# Patient Record
Sex: FEMALE | Race: WHITE | URBAN - METROPOLITAN AREA
[De-identification: names, ages, dates, MRNs, and addresses within clinical notes are randomized per-mention and may not be internally consistent; named-entity substitution may affect disease eponyms.]

---

## 2020-12-26 LAB
BASOPHILS ABSOLUTE: ABNORMAL
BASOPHILS RELATIVE PERCENT: ABNORMAL
BUN BLDV-MCNC: 22 MG/DL
CALCIUM SERPL-MCNC: 8.2 MG/DL
CHLORIDE BLD-SCNC: 94 MMOL/L
CO2: 33 MMOL/L
CREAT SERPL-MCNC: 0.92 MG/DL
EOSINOPHILS ABSOLUTE: ABNORMAL
EOSINOPHILS RELATIVE PERCENT: ABNORMAL
GFR CALCULATED: NORMAL
GLUCOSE BLD-MCNC: 98 MG/DL
HCT VFR BLD CALC: 33.8 % (ref 36–46)
HEMOGLOBIN: 11 G/DL (ref 12–16)
LYMPHOCYTES ABSOLUTE: ABNORMAL
LYMPHOCYTES RELATIVE PERCENT: ABNORMAL
MCH RBC QN AUTO: 33.7 PG
MCHC RBC AUTO-ENTMCNC: 32.5 G/DL
MCV RBC AUTO: 103.8 FL
MONOCYTES ABSOLUTE: ABNORMAL
MONOCYTES RELATIVE PERCENT: ABNORMAL
NEUTROPHILS ABSOLUTE: ABNORMAL
NEUTROPHILS RELATIVE PERCENT: ABNORMAL
PLATELET # BLD: 81 K/ΜL
PMV BLD AUTO: ABNORMAL FL
POTASSIUM SERPL-SCNC: 3.1 MMOL/L
RBC # BLD: 3.26 10^6/ΜL
SODIUM BLD-SCNC: 139 MMOL/L
TROPONIN: 0.11
WBC # BLD: 4.9 10^3/ML

## 2020-12-28 ENCOUNTER — OFFICE VISIT (OUTPATIENT)
Dept: GERIATRIC MEDICINE | Age: 85
End: 2020-12-28
Payer: MEDICARE

## 2020-12-28 VITALS
BODY MASS INDEX: 21.56 KG/M2 | RESPIRATION RATE: 16 BRPM | TEMPERATURE: 98.7 F | HEART RATE: 68 BPM | DIASTOLIC BLOOD PRESSURE: 70 MMHG | OXYGEN SATURATION: 97 % | HEIGHT: 64 IN | WEIGHT: 126.3 LBS | SYSTOLIC BLOOD PRESSURE: 130 MMHG

## 2020-12-28 PROBLEM — M06.9 RHEUMATOID ARTHRITIS (HCC): Status: ACTIVE | Noted: 2020-12-28

## 2020-12-28 PROBLEM — K21.9 GASTROESOPHAGEAL REFLUX DISEASE WITHOUT ESOPHAGITIS: Status: ACTIVE | Noted: 2020-12-28

## 2020-12-28 PROBLEM — I87.2 PERIPHERAL VENOUS INSUFFICIENCY: Status: ACTIVE | Noted: 2020-12-28

## 2020-12-28 PROBLEM — Z98.890 H/O SPINAL SURGERY: Status: ACTIVE | Noted: 2020-12-28

## 2020-12-28 PROBLEM — I95.1 ORTHOSTATIC HYPOTENSION: Status: ACTIVE | Noted: 2020-12-28

## 2020-12-28 PROBLEM — H90.3 SENSORINEURAL HEARING LOSS, BILATERAL: Status: ACTIVE | Noted: 2019-01-07

## 2020-12-28 PROBLEM — Z98.890 HISTORY OF REPAIR OF ROTATOR CUFF: Status: ACTIVE | Noted: 2020-12-28

## 2020-12-28 PROBLEM — Z87.19 HISTORY OF ABDOMINAL HERNIA: Status: ACTIVE | Noted: 2020-12-28

## 2020-12-28 PROBLEM — Z96.659 HISTORY OF TOTAL KNEE ARTHROPLASTY: Status: ACTIVE | Noted: 2020-12-28

## 2020-12-28 PROBLEM — N39.0 RECURRENT URINARY TRACT INFECTION: Status: ACTIVE | Noted: 2020-12-28

## 2020-12-28 PROBLEM — F41.8 MIXED ANXIETY AND DEPRESSIVE DISORDER: Status: ACTIVE | Noted: 2020-12-28

## 2020-12-28 PROBLEM — R26.2 DIFFICULTY WALKING: Status: ACTIVE | Noted: 2020-12-28

## 2020-12-28 PROBLEM — M81.0 OSTEOPOROSIS: Status: ACTIVE | Noted: 2020-12-28

## 2020-12-28 PROBLEM — Z86.39 H/O: HYPOTHYROIDISM: Status: ACTIVE | Noted: 2020-12-28

## 2020-12-28 PROBLEM — I48.0 PAROXYSMAL ATRIAL FIBRILLATION (HCC): Status: ACTIVE | Noted: 2020-12-28

## 2020-12-28 PROBLEM — M43.10 ACQUIRED SPONDYLOLISTHESIS: Status: ACTIVE | Noted: 2020-12-28

## 2020-12-28 PROBLEM — I82.403 DEEP VEIN THROMBOSIS (DVT) OF BOTH LOWER EXTREMITIES (HCC): Status: ACTIVE | Noted: 2020-12-28

## 2020-12-28 PROBLEM — I10 ESSENTIAL HYPERTENSION: Status: ACTIVE | Noted: 2020-12-28

## 2020-12-28 PROBLEM — M19.90 OSTEOARTHROSIS: Status: ACTIVE | Noted: 2020-12-28

## 2020-12-28 PROBLEM — I50.22 CHRONIC SYSTOLIC HEART FAILURE (HCC): Status: ACTIVE | Noted: 2020-12-28

## 2020-12-28 PROBLEM — I25.10 ARTERIOSCLEROSIS OF CORONARY ARTERY: Status: ACTIVE | Noted: 2020-12-28

## 2020-12-28 PROBLEM — Z95.810 IMPLANTABLE CARDIOVERTER-DEFIBRILLATOR (ICD) IN SITU: Status: ACTIVE | Noted: 2020-12-28

## 2020-12-28 LAB — TROPONIN: 0.12 NG/ML (ref 0–0.01)

## 2020-12-28 PROCEDURE — 1123F ACP DISCUSS/DSCN MKR DOCD: CPT | Performed by: INTERNAL MEDICINE

## 2020-12-28 PROCEDURE — 99304 1ST NF CARE SF/LOW MDM 25: CPT | Performed by: INTERNAL MEDICINE

## 2020-12-28 PROCEDURE — G8484 FLU IMMUNIZE NO ADMIN: HCPCS | Performed by: INTERNAL MEDICINE

## 2020-12-28 RX ORDER — ATORVASTATIN CALCIUM 40 MG/1
40 TABLET, FILM COATED ORAL DAILY
COMMUNITY

## 2020-12-28 RX ORDER — TORSEMIDE 20 MG/1
20 TABLET ORAL EVERY MORNING
COMMUNITY

## 2020-12-28 RX ORDER — OXYCODONE HYDROCHLORIDE AND ACETAMINOPHEN 5; 325 MG/1; MG/1
1 TABLET ORAL EVERY 6 HOURS PRN
Qty: 45 TABLET | Refills: 0 | Status: SHIPPED | OUTPATIENT
Start: 2020-12-28 | End: 2021-01-11

## 2020-12-28 RX ORDER — PREDNISONE 2.5 MG
2.5 TABLET ORAL DAILY
COMMUNITY

## 2020-12-28 RX ORDER — TORSEMIDE 10 MG/1
10 TABLET ORAL NIGHTLY
COMMUNITY

## 2020-12-28 RX ORDER — ZINC GLUCONATE 50 MG
50 TABLET ORAL DAILY
COMMUNITY

## 2020-12-28 RX ORDER — CLOTRIMAZOLE 1 %
CREAM (GRAM) TOPICAL
COMMUNITY

## 2020-12-28 RX ORDER — DIGOXIN 125 MCG
125 TABLET ORAL
COMMUNITY

## 2020-12-28 RX ORDER — FOLIC ACID 1 MG/1
1 TABLET ORAL DAILY
COMMUNITY

## 2020-12-28 RX ORDER — DULOXETIN HYDROCHLORIDE 60 MG/1
60 CAPSULE, DELAYED RELEASE ORAL DAILY
COMMUNITY

## 2020-12-28 RX ORDER — MAGNESIUM OXIDE 400 MG/1
400 TABLET ORAL NIGHTLY
COMMUNITY

## 2020-12-28 RX ORDER — HYDROXYCHLOROQUINE SULFATE 200 MG/1
200 TABLET, FILM COATED ORAL DAILY
COMMUNITY

## 2020-12-28 RX ORDER — GABAPENTIN 100 MG/1
100 CAPSULE ORAL 3 TIMES DAILY
COMMUNITY

## 2020-12-28 RX ORDER — ALENDRONATE SODIUM 70 MG/1
70 TABLET ORAL
COMMUNITY

## 2020-12-28 RX ORDER — ACETAMINOPHEN 325 MG/1
650 TABLET ORAL EVERY 4 HOURS PRN
COMMUNITY

## 2020-12-28 RX ORDER — GUARN/MA-HUANG/P.GIN/S.GINSENG
2 TABLET ORAL NIGHTLY
COMMUNITY

## 2020-12-28 RX ORDER — POTASSIUM CHLORIDE 750 MG/1
10 TABLET, FILM COATED, EXTENDED RELEASE ORAL DAILY
COMMUNITY

## 2020-12-28 RX ORDER — OXYCODONE HYDROCHLORIDE AND ACETAMINOPHEN 5; 325 MG/1; MG/1
1 TABLET ORAL EVERY 6 HOURS PRN
COMMUNITY
End: 2020-12-28 | Stop reason: SDUPTHER

## 2020-12-28 RX ORDER — PANTOPRAZOLE SODIUM 40 MG/1
40 TABLET, DELAYED RELEASE ORAL DAILY
COMMUNITY

## 2020-12-28 RX ORDER — METOPROLOL TARTRATE 50 MG/1
50 TABLET, FILM COATED ORAL 2 TIMES DAILY
COMMUNITY

## 2020-12-28 RX ORDER — UBIDECARENONE 75 MG
50 CAPSULE ORAL DAILY
COMMUNITY

## 2020-12-28 RX ORDER — LANOLIN ALCOHOL/MO/W.PET/CERES
3 CREAM (GRAM) TOPICAL NIGHTLY
COMMUNITY

## 2020-12-28 RX ORDER — ELECTROLYTES/DEXTROSE
1 SOLUTION, ORAL ORAL DAILY
COMMUNITY

## 2020-12-28 ASSESSMENT — ENCOUNTER SYMPTOMS
EYE PAIN: 0
SHORTNESS OF BREATH: 0
BACK PAIN: 1
TROUBLE SWALLOWING: 0
DIARRHEA: 0
COUGH: 0
ABDOMINAL PAIN: 0
NAUSEA: 1
VOMITING: 0

## 2020-12-28 NOTE — PROGRESS NOTES
Maria M Guardado is a 81 y.o. female with history of cardiomyopathy, depression, intermittent atrial fibrillation, osteoporosis, rheumatoid arthritis, whom I am seeing at [address] [place] [address] for initial evaluation for the admission of 12/25/2020, for observation and nursing care after having tested positive for coronavirus 19 infection on the same date. The patient was seen with his/her consent in his/her room at the facility. I also spoke with the nurse and reviewed the hospital and nursing home records. Chief Complaint   Patient presents with    Positive For Covid-19    Extremity Weakness       Interim history: Since the transfer to this skilled nursing facility the patient has been complaining of fatigue, nausea, otherwise has been well, has maintained oxygen saturation above 94%, no need for supplemental oxygen, no shortness of breath, no myalgias, diarrhea, headaches, no vomiting. Today she was seated in the recliner and states she started to feel much better, much more comfortable. Patient's daughter is concerned of her mother not getting physical therapy in a timely manner after the spinal surgery of November 2002.        Laboratory and imaging studies reports reviewed included (but were not limited to) the following:    Orders Only on 12/28/2020   Component Date Value Ref Range Status    Troponin 12/28/2020 0.123* 0.000 - 0.010 ng/mL Final    Methodology by Troponin T.   Orders Only on 12/28/2020   Component Date Value Ref Range Status    WBC 12/26/2020 4.9  10^3/mL Final    Hemoglobin 12/26/2020 11.0* 12.0 - 16.0 g/dL Final    Hematocrit 12/26/2020 33.8* 36 - 46 % Final    Platelets 54/56/0800 81  K/µL Final    RBC 12/26/2020 3.26  10^6/µL Final    MCV 12/26/2020 103.8  fL Final    MCH 12/26/2020 33.7  pg Final    MCHC 12/26/2020 32.5  g/dL Final    Sodium 12/26/2020 139  mmol/L Final    Chloride 12/26/2020 94  mmol/L Final    Potassium 12/26/2020 3.1  mmol/L Final  BUN 12/26/2020 22  mg/dL Final    CREATININE 12/26/2020 0.92   Final    Glucose 12/26/2020 98  mg/dL Final    CO2 12/26/2020 33  mmol/L Final    Calcium 12/26/2020 8.2  mg/dL Final    Troponin 12/26/2020 0. 111   Final       HPI:    As detailed above in the chiefcomplaint(s), interim history and below in the review of systems. Extremity Weakness  The current episode started more than 1 month ago. The problem occurs constantly. The problem has been gradually worsening. Associated symptoms include nausea and weakness. Pertinent negatives include no abdominal pain, chest pain, chills, congestion, coughing, fatigue, fever, headaches, myalgias, rash or vomiting. The treatment provided no relief. Past Medical History:   Diagnosis Date    Cardiomyopathy [facility])     Depression     Intermittent atrial fibrillation (HCC)     Osteoporosis     Rheumatoid arthritis ([facility] [address]     Spinal stenosis        Past Surgical History:   Procedure Laterality Date    SPINE SURGERY  11/2020       Social History     Socioeconomic History    Marital status:       Spouse name: Not on file    Number of children: Not on file    Years of education: Not on file    Highest education level: Not on file   Occupational History    Not on file   Social Needs    Financial resource strain: Not on file    Food insecurity     Worry: Not on file     Inability: Not on file    Transportation needs     Medical: Not on file     Non-medical: Not on file   Tobacco Use    Smoking status: Unknown If Ever Smoked   Substance and Sexual Activity    Alcohol use: Not on file    Drug use: Not on file    Sexual activity: Not on file   Lifestyle    Physical activity     Days per week: Not on file     Minutes per session: Not on file    Stress: Not on file   Relationships    Social connections     Talks on phone: Not on file     Gets together: Not on file     Attends Yazidi service: Not on file     Active member of club or weight. She is not ill-appearing. Comments: Seated in recliner, age-appropriate, calm and pleasant    Due to concerns for coronavirus infection spread, during today's face to face encounter, the physical exam was limited. HENT:      Head: Atraumatic. Nose: No rhinorrhea. Mouth/Throat:      Mouth: Mucous membranes are moist.   Eyes:      General: No scleral icterus. Extraocular Movements: Extraocular movements intact. Cardiovascular:      Rate and Rhythm: Normal rate and regular rhythm. Occasional extrasystoles are present. Pulses:           Radial pulses are 2+ on the right side and 2+ on the left side. Heart sounds: Normal heart sounds. No gallop. Comments: Seated in the recliner with both legs elevated. Marked stasis dermatitis noted  Pulmonary:      Effort: Pulmonary effort is normal. No respiratory distress. Abdominal:      General: There is no distension. Palpations: Abdomen is soft. Musculoskeletal:      Right lower leg: No edema. Left lower leg: No edema. Skin:     General: Skin is warm. Coloration: Skin is not jaundiced or pale. Findings: No rash. Neurological:      General: No focal deficit present. Mental Status: She is alert and oriented to person, place, and time. Motor: Weakness present. Coordination: Coordination normal.      Comments: Stance and gait not tested, please refer to the physical therapy notes   Psychiatric:         Attention and Perception: Attention normal.         Mood and Affect: Mood is not anxious or depressed. Speech: Speech normal.         Behavior: Behavior normal. Behavior is not slowed or withdrawn. Behavior is cooperative. Thought Content: Thought content is not delusional.         Cognition and Memory: Cognition is not impaired. Memory is not impaired. Assessment:    Sandip De León was seen today for positive for covid-19 and extremity weakness.     Diagnoses and all orders for this visit:    COVID-19 virus infection            Asymptomatic, continue close monitoring, continue current COVID-19 protocol including laboratories. Patient already on chronic oral steroids, anticoagulation, vitamins, due to underlying comorbidities. Cardiomyopathy, unspecified type ([facility] [address]              Stable, continue all current cardiac medications. Watch for orthostatic hypotension which has caused falls or near falls in the recent past.      Rheumatoid arthritis, involving unspecified site, unspecified whether rheumatoid factor present ([facility] [address]               Minimally symptomatic, continue current medications including methotrexate, low-dose oral steroid, hydroxychloroquine, supplements. Plan:    See all orders and comments in the assessment section. Reviewed with the patient/nurse today's diagnosis and associated problems, treatment plans, prognosis, questions answered. The current medical regimen is effective;  continue present plan and medications. Orders for repeat laboratories placed in the nursing home chart. Close follow up needed in one week with CNP or with MD.       I have reviewed the patient's medical and surgical, family and social history, health maintenance schedule, and updated the computerized patient record. Please note this report has been partially produced by using speech recognition hardware. It may contain errors related to the system, including grammar, punctuation and spelling as well as words and phrases that may seem inaccurate. For anyquestions or concerns, please feel free to contact me for clarification.         Electronically signed by Isaiah Matthew MD

## 2020-12-29 ENCOUNTER — OFFICE VISIT (OUTPATIENT)
Dept: GERIATRIC MEDICINE | Age: 85
End: 2020-12-29
Payer: MEDICARE

## 2020-12-29 DIAGNOSIS — E87.6 HYPOKALEMIA: ICD-10-CM

## 2020-12-29 DIAGNOSIS — R53.1 WEAKNESS: ICD-10-CM

## 2020-12-29 DIAGNOSIS — U07.1 COVID-19: Primary | ICD-10-CM

## 2020-12-29 DIAGNOSIS — R26.2 DIFFICULTY WALKING: ICD-10-CM

## 2020-12-29 PROCEDURE — 1123F ACP DISCUSS/DSCN MKR DOCD: CPT | Performed by: NURSE PRACTITIONER

## 2020-12-29 PROCEDURE — G8484 FLU IMMUNIZE NO ADMIN: HCPCS | Performed by: NURSE PRACTITIONER

## 2020-12-29 PROCEDURE — 99309 SBSQ NF CARE MODERATE MDM 30: CPT | Performed by: NURSE PRACTITIONER

## 2020-12-31 LAB
BUN BLDV-MCNC: 27 MG/DL
CALCIUM SERPL-MCNC: 7.8 MG/DL
CHLORIDE BLD-SCNC: 97 MMOL/L
CO2: 33 MMOL/L
CREAT SERPL-MCNC: 1 MG/DL
GFR CALCULATED: NORMAL
GLUCOSE BLD-MCNC: 78 MG/DL
POTASSIUM SERPL-SCNC: 3.6 MMOL/L
SODIUM BLD-SCNC: 141 MMOL/L

## 2021-01-04 LAB — TROPONIN: 0.12 NG/ML (ref 0–0.01)

## 2021-01-19 NOTE — PROGRESS NOTES
[address], [address]      2020    HPI:    Delfina Naranjo (:  1935) has requested an evaluation for the following concern(s):      visit for CC of COVID-19. IN COVID UNIT, FEELS HER APPETITE IS GOOD BUT DOES FEEL WEAKER IN GENERAL  Pt/Nurse noted  they had c/o  WEAKNESS AND INABILITY TO WALK AS FAR. Is worsened by COVID INFECTION    Review of Systems  ROS: Nurse/pt reports   Constitutional: There are  no reports of behavioral issues,   Had decrease in appetite, C/O FOOD TASTES BAD BUT DENIES TASTE GONE and low grade fever, is now weaker. Had  NO runny nose, sore throat, and  NO cough. No c/o loss of taste or smell,  Respiratory:NO  SOB, dyspnea, dyspnea on exertion, change in exercise capacity  Cardiovascular: denies CP, lightheadedness, palpitations, PND, orthopnea, or claudication. GI: No N/V/D. : no reports of dysuria  Extremities: No reports of pain issues, NO edema     BUN is 22  Creatinine 0.9  Potassium 3.1    Prior to Visit Medications    Medication Sig Taking?  Authorizing Provider   acetaminophen (TYLENOL) 325 MG tablet Take 650 mg by mouth every 4 hours as needed for Pain or Fever  Historical Provider, MD   alendronate (FOSAMAX) 70 MG tablet Take 70 mg by mouth every 7 days  Historical Provider, MD   atorvastatin (LIPITOR) 40 MG tablet Take 40 mg by mouth daily  Historical Provider, MD   vitamin B-12 (CYANOCOBALAMIN) 100 MCG tablet Take 50 mcg by mouth daily  Historical Provider, MD   Calcium 600-200 MG-UNIT TABS Take 2 tablets by mouth nightly  Historical Provider, MD   clotrimazole (LOTRIMIN) 1 % cream Apply topically Apply topically to back rash every shift  Historical Provider, MD   digoxin (LANOXIN) 125 MCG tablet Take 125 mcg by mouth In the morning every Mon, Wed, Fri  Historical Provider, MD   DULoxetine (CYMBALTA) 60 MG extended release capsule Take 60 mg by mouth daily  Historical Provider, MD   apixaban (ELIQUIS) 5 MG TABS tablet Take by mouth 2 times daily  Historical Provider, MD   FERROUS SULFATE PO Take 1 tablet by mouth every morning  Historical Provider, MD   folic acid (FOLVITE) 1 MG tablet Take 1 mg by mouth daily  Historical Provider, MD   gabapentin (NEURONTIN) 100 MG capsule Take 100 mg by mouth 3 times daily.   Historical Provider, MD   hydroxychloroquine (PLAQUENIL) 200 MG tablet Take 200 mg by mouth daily  Historical Provider, MD   potassium chloride (KLOR-CON) 10 MEQ extended release tablet Take 10 mEq by mouth daily  Historical Provider, MD    LIDOCAINE PATCH EX Apply topically  Historical Provider, MD   magnesium oxide (MAG-OX) 400 MG tablet Take 400 mg by mouth nightly  Historical Provider, MD   melatonin 3 MG TABS tablet Take 3 mg by mouth nightly  Historical Provider, MD   methotrexate (RHEUMATREX) 2.5 MG chemo tablet Take 15 mg by mouth once a week  Historical Provider, MD   metoprolol tartrate (LOPRESSOR) 50 MG tablet Take 50 mg by mouth 2 times daily  Historical Provider, MD   Multiple Vitamins-Minerals (MULTIVITAMIN ADULT) TABS Take 1 tablet by mouth daily  Historical Provider, MD   pantoprazole (PROTONIX) 40 MG tablet Take 40 mg by mouth daily  Historical Provider, MD   predniSONE (DELTASONE) 2.5 MG tablet Take 2.5 mg by mouth daily  Historical Provider, MD   QUEtiapine (SEROQUEL) 12.5 MG TABS Take 12.5 mg by mouth nightly For behaviors  Historical Provider, MD   torsemide (DEMADEX) 10 MG tablet Take 10 mg by mouth nightly  Historical Provider, MD   torsemide (DEMADEX) 20 MG tablet Take 20 mg by mouth every morning  Historical Provider, MD   Ascorbic Acid (VITAMIN C PO) Take 1 tablet by mouth 2 times daily  Historical Provider, MD   vitamin D (CHOLECALCIFEROL) 25 MCG (1000 UT) TABS tablet Take 1,000 Units by mouth 2 times daily  Historical Provider, MD   zinc gluconate 50 MG tablet Take 50 mg by mouth daily  Historical Provider, MD       Social History     Tobacco Use    Smoking status: Unknown If Ever Smoked Substance Use Topics    Alcohol use: Not on file    Drug use: Not on file        Allergies   Allergen Reactions    Codeine    ,   Past Medical History:   Diagnosis Date    [facility] ([facility] [address]     Depression     Intermittent atrial fibrillation (HCC)     Osteoporosis     Rheumatoid arthritis ([facility] [address]     Spinal stenosis     Venous insufficiency (chronic) (peripheral)    ,   Past Surgical History:   Procedure Laterality Date    SPINE SURGERY  11/2020   ,   Social History     Tobacco Use    Smoking status: Unknown If Ever Smoked   Substance Use Topics    Alcohol use: Not on file    Drug use: Not on file   ,   Family History   Family history unknown: Yes       PHYSICAL EXAMINATION:  BP  92/62  T  98  P  96  R  16    126LB    Constitutional: [x] Appears well-developed and well-nourished [x] No apparent distress        Mental status  [x] Alert and awake  [x] Oriented to person/place/ []Able to follow commands       Speech is clear, and appropriate    Eyes:  EOM    [x]  Normal  [] Abnormal-  Sclera  [x]  Normal  [] Abnormal -         Discharge [x]  None visible  [] Abnormal -    HENT:   [x] Normocephalic, atraumatic.   [] Abnormal   [x] Mouth/Throat: Mucous membranes are moist.     External Ears [x] Normal  [] Abnormal-     Neck: [x] No visualized mass     Pulmonary/Chest:   Heart sounds    RRR  Lungs  sounds   CLEAR   [x] Respiratory effort normal.  [x] No visualized signs of difficulty breathing or respiratory distress            Musculoskeletal:           [x] Normal range of motion of neck        [x] Abnormal-   LIMITED ROM AT BASELINE     Neurological:        [x] No Facial Asymmetry (Cranial nerve 7 motor function) (limited exam to video visit)          [x] No gaze palsy            Skin:        [x] No significant exanthematous lesions or discoloration noted on facial skin                  Psychiatric:       [x] Normal Affect [x] No Hallucinations   NORMAL THOUGHT CONTENT, GOOD JUDGEMENT              Other pertinent observable physical exam findings-     ASSESSMENT/PLAN:   Diagnosis Orders   1. COVID-19     2. Difficulty walking     3. Weakness     4. Hypokalemia       1. D DIMER NOW 0.75. RESOLVING COVID, FEELS BETTER  2. ATAXIA WILL NEED THERAPY AT TRANSFER  3. GENERAL WEAKNESS IS CONTRIBUTING TO DIFFICULTY WALKING WITHOUT IMBALANCE   Encourage boost supplementation  4. Replace potassium 10 mEq twice daily  Reassess BMP    Standing orders for COVID -19 labs on diagnosis AND q week:  CBC  BMP  LFT  LDH  FERRITIN  PROCALCITONIN  CRP  D-DIMER  TROPONIN  CPK  PT/INR    CXR ON DIAGNOSIS    medications:  Vit D3 1000 units po bid  VIt C 500mg po bid  Zinc 50mg po daily  Pepcid 20mg po daily IF NOT on PPI    Continue same meds and POC    Return if symptoms worsen or fail to improve. --Dary Brown, ZACH - CNP on 1/24/2021 at 11:51 PM    An electronic signature was used to authenticate this note. Please note this report is partially produced by using speech recognition hardware. It may contain errors related to the system, including grammar, punctuation and spelling as well as words and phrases that may seem inaccurate.   For any questions or concerns feel free to contact me for clarification